# Patient Record
Sex: FEMALE | Race: OTHER | URBAN - METROPOLITAN AREA
[De-identification: names, ages, dates, MRNs, and addresses within clinical notes are randomized per-mention and may not be internally consistent; named-entity substitution may affect disease eponyms.]

---

## 2017-03-03 ENCOUNTER — OFFICE VISIT (OUTPATIENT)
Dept: FAMILY MEDICINE CLINIC | Facility: CLINIC | Age: 22
End: 2017-03-03

## 2017-03-03 VITALS
BODY MASS INDEX: 23.55 KG/M2 | SYSTOLIC BLOOD PRESSURE: 118 MMHG | OXYGEN SATURATION: 99 % | DIASTOLIC BLOOD PRESSURE: 60 MMHG | HEART RATE: 90 BPM | RESPIRATION RATE: 16 BRPM | WEIGHT: 128 LBS | TEMPERATURE: 99 F | HEIGHT: 62 IN

## 2017-03-03 DIAGNOSIS — J01.00 ACUTE NON-RECURRENT MAXILLARY SINUSITIS: Primary | ICD-10-CM

## 2017-03-03 PROCEDURE — 99202 OFFICE O/P NEW SF 15 MIN: CPT | Performed by: PHYSICIAN ASSISTANT

## 2017-03-03 RX ORDER — AMOXICILLIN AND CLAVULANATE POTASSIUM 875; 125 MG/1; MG/1
1 TABLET, FILM COATED ORAL 2 TIMES DAILY
Qty: 20 TABLET | Refills: 0 | Status: SHIPPED | OUTPATIENT
Start: 2017-03-03 | End: 2017-03-13

## 2017-03-03 NOTE — PROGRESS NOTES
CHIEF COMPLAINT:   Patient presents with:  Sinus Problem      HPI:   Rubén Celis is a 22year old female who presents for URI sxs for  2 weeks.   Patient reports nasal congestion with lime green discharge, maxillary sinus pain/pressure, sore throat rate LUNGS: clear to auscultation bilaterally, no wheezes or rhonchi. Breathing is non labored. CARDIO: RRR without murmur   EXTREMITIES: no cyanosis, clubbing or edema  LYMPH:  No anterior cervical lymphadenopathy. No submandibular lymphadenopathy.   No poster · Blockage in the nose  Symptoms of ABRS  The symptoms of ABRS may be different for each person, and can include:  · Nasal congestion  · Runny nose  · Fluid draining from the nose down the throat (postnasal drip)  · Headache  · Cough  · Pain in the sinuses · Swelling around your eyes  · Confusion or trouble staying awake   Date Last Reviewed: 3/3/2015  © 1670-2840 [address], [address] [place] [address]. All rights reserved.  This information is not intended as a substitute for pr

## 2017-03-03 NOTE — PATIENT INSTRUCTIONS
-Cool mist humidifier at night  -Warm tea with honey  -Sudafed  -Flonase  -Motrin for pain or fever    Acute Bacterial Rhinosinusitis (ABRS)  Acute bacterial rhinosinusitis (ABRS) is an infection of your nasal cavity and sinuses. It’s caused by bacteria.  A · Nasal corticosteroid medicine. Drops or spray used in the nose can lessen swelling and congestion. · Over-the-counter pain medicine. This is to lessen sinus pain and pressure. · Nasal decongestant medicine. Spray or drops may help to lessen congestion.

## 2020-12-21 ENCOUNTER — OFFICE VISIT (OUTPATIENT)
Dept: FAMILY MEDICINE CLINIC | Facility: CLINIC | Age: 25
End: 2020-12-21
Payer: COMMERCIAL

## 2020-12-21 VITALS
RESPIRATION RATE: 18 BRPM | SYSTOLIC BLOOD PRESSURE: 112 MMHG | TEMPERATURE: 98 F | BODY MASS INDEX: 27.6 KG/M2 | HEIGHT: 62 IN | WEIGHT: 150 LBS | HEART RATE: 67 BPM | DIASTOLIC BLOOD PRESSURE: 70 MMHG | OXYGEN SATURATION: 98 %

## 2020-12-21 DIAGNOSIS — J02.9 SORE THROAT: Primary | ICD-10-CM

## 2020-12-21 DIAGNOSIS — J01.00 ACUTE NON-RECURRENT MAXILLARY SINUSITIS: ICD-10-CM

## 2020-12-21 PROCEDURE — 3078F DIAST BP <80 MM HG: CPT | Performed by: PHYSICIAN ASSISTANT

## 2020-12-21 PROCEDURE — 99202 OFFICE O/P NEW SF 15 MIN: CPT | Performed by: PHYSICIAN ASSISTANT

## 2020-12-21 PROCEDURE — 87880 STREP A ASSAY W/OPTIC: CPT | Performed by: PHYSICIAN ASSISTANT

## 2020-12-21 PROCEDURE — 3074F SYST BP LT 130 MM HG: CPT | Performed by: PHYSICIAN ASSISTANT

## 2020-12-21 PROCEDURE — 3008F BODY MASS INDEX DOCD: CPT | Performed by: PHYSICIAN ASSISTANT

## 2020-12-21 RX ORDER — AMOXICILLIN 875 MG/1
875 TABLET, COATED ORAL 2 TIMES DAILY
Qty: 20 TABLET | Refills: 0 | Status: SHIPPED | OUTPATIENT
Start: 2020-12-21 | End: 2020-12-21

## 2020-12-21 RX ORDER — AMOXICILLIN AND CLAVULANATE POTASSIUM 875; 125 MG/1; MG/1
1 TABLET, FILM COATED ORAL 2 TIMES DAILY
Qty: 20 TABLET | Refills: 0 | Status: SHIPPED | OUTPATIENT
Start: 2020-12-21 | End: 2020-12-31

## 2020-12-21 NOTE — PATIENT INSTRUCTIONS
-push fluids  -Cool mist humidifier  -Sudafed during the day. Acute Bacterial Rhinosinusitis (ABRS)    Acute bacterial rhinosinusitis (ABRS) is an infection of your nasal cavity and sinuses. It’s caused by bacteria.  Acute means that you’ve had sympto corticosteroid medicine. Drops or spray used in the nose can lessen swelling and congestion. · Over-the-counter pain medicine. This is to lessen sinus pain and pressure. · Nasal decongestant medicine. Spray or drops may help to lessen congestion.  Do not

## 2020-12-21 NOTE — PROGRESS NOTES
CHIEF COMPLAINT:   Patient presents with:  Sinus Problem: sinus pain and pressure, congestion x 1 month ear pressure, sore throat x       HPI:   Dennis Vallejo is a 26year old female who presents for sinus pressure for  1 month.   Patient reports nasal co NOSE: Nostrils patent, no nasal discharge, nasal mucosa pink   THROAT: oral mucosa pink, moist. Posterior pharynx +mildly erythematous and injected. N0 exudates.  .  No uvular deviation, drooling, muffled voice, hot potato voice, trismus, or signs of absces The nasal cavity is the large air-filled space behind your nose. The sinuses are a group of spaces formed by the bones of your face. They connect with your nasal cavity. ABRS causes the tissue lining these spaces to become inflamed.  Mucus may not drain nor · Salt wash (saline irrigation). This can help to loosen mucus. Possible complications of ABRS  ABRS may come back or become long-term (chronic).  In rare cases, ABRS may cause complications such as:   · Inflamed tissue around the brain and spinal cord (me

## 2021-09-20 ENCOUNTER — OFFICE VISIT (OUTPATIENT)
Dept: FAMILY MEDICINE CLINIC | Facility: CLINIC | Age: 26
End: 2021-09-20
Payer: COMMERCIAL

## 2021-09-20 VITALS
SYSTOLIC BLOOD PRESSURE: 127 MMHG | OXYGEN SATURATION: 99 % | TEMPERATURE: 98 F | DIASTOLIC BLOOD PRESSURE: 79 MMHG | HEIGHT: 63 IN | RESPIRATION RATE: 16 BRPM | BODY MASS INDEX: 28.35 KG/M2 | HEART RATE: 86 BPM | WEIGHT: 160 LBS

## 2021-09-20 DIAGNOSIS — J02.9 SORE THROAT: Primary | ICD-10-CM

## 2021-09-20 DIAGNOSIS — J01.00 ACUTE NON-RECURRENT MAXILLARY SINUSITIS: ICD-10-CM

## 2021-09-20 LAB
CONTROL LINE PRESENT WITH A CLEAR BACKGROUND (YES/NO): YES YES/NO
KIT LOT #: NORMAL NUMERIC

## 2021-09-20 PROCEDURE — 3074F SYST BP LT 130 MM HG: CPT | Performed by: PHYSICIAN ASSISTANT

## 2021-09-20 PROCEDURE — 99213 OFFICE O/P EST LOW 20 MIN: CPT | Performed by: PHYSICIAN ASSISTANT

## 2021-09-20 PROCEDURE — 3078F DIAST BP <80 MM HG: CPT | Performed by: PHYSICIAN ASSISTANT

## 2021-09-20 PROCEDURE — 3008F BODY MASS INDEX DOCD: CPT | Performed by: PHYSICIAN ASSISTANT

## 2021-09-20 PROCEDURE — 87880 STREP A ASSAY W/OPTIC: CPT | Performed by: PHYSICIAN ASSISTANT

## 2021-09-20 RX ORDER — LEVONORGESTREL 52 MG/1
1 INTRAUTERINE DEVICE INTRAUTERINE ONCE
COMMUNITY

## 2021-09-20 RX ORDER — AMOXICILLIN AND CLAVULANATE POTASSIUM 875; 125 MG/1; MG/1
1 TABLET, FILM COATED ORAL 2 TIMES DAILY
Qty: 20 TABLET | Refills: 0 | Status: SHIPPED | OUTPATIENT
Start: 2021-09-20 | End: 2021-09-30

## 2021-09-20 RX ORDER — FLUTICASONE PROPIONATE 50 MCG
1 SPRAY, SUSPENSION (ML) NASAL DAILY
COMMUNITY
Start: 2021-06-23

## 2021-09-20 NOTE — PROGRESS NOTES
CHIEF COMPLAINT:   Patient presents with:  Sinus Problem    HPI:   Rosalie Santos is a 26year old female who presents with URI symptoms for 7 days. Started off as more allergies, but sxs are worsening. Patient denies known COVID exposure.  Patient is vac 16   Ht 5' 3\" (1.6 m)   Wt 160 lb (72.6 kg)   LMP 08/18/2021 (LMP Unknown)   SpO2 99%   Breastfeeding No   BMI 28.34 kg/m²   GENERAL: well developed, well nourished,in no apparent distress  SKIN: no rashes,no suspicious lesions  HEAD: atraumatic, normocep explained and discussed.         Patient Instructions     -Push fluids  -Cool mist humidifier  -Tylenol  -mucinex or sudafed  -Must be seen with worsening symptoms  -May start antibiotic if covid test is negative and symptoms are not improving    Alejandra Follow the recommendations of your local public health department if you need to quarantine.  Options they will consider include stopping quarantine  • After 14 days from date of last exposure  • After 10 days without testing from date of last exposure  • A people in your household, like dishes, towels, and bedding   10. Clean all surfaces that are touched often, like counters, tabletops, and doorknobs. Use household cleaning sprays or wipes according to the label instructions.          Seek Further Care     I instructions. Your test results will be called to you from an [facility] representative. If you have not received a call within 2 business days, please call your primary care provider or check [facility] for results.     Post-Discharge Follow-up  If you following websites:   Centers for Disease Control & Prevention (CDC)  What to do if you are sick with coronavirus disease 2019, [email or web address].[email or web address]. pdf  Centers for Disease Control & Prevention • Wear a mask in public  • Avoid large gatherings  • Wash your hands frequently  • Stay at least 6 feet away from other people    References:  Long haulers: Why some people experience long-term coronavirus symptoms. (2021, February 08).  Retrieved March 1 Esther Garcia had an upper respiratory infection like a cold and cough for 10 or more days without improvement or with worsening symptoms. Your healthcare provider will ask about your symptoms and your medical history.  The provider will check your vital signs, in instructions. The patient/parent indicates understanding of these issues and agrees to the plan.

## 2021-09-20 NOTE — PATIENT INSTRUCTIONS
-Push fluids  -Cool mist humidifier  -Tylenol  -mucinex or sudafed  -Must be seen with worsening symptoms  -May start antibiotic if covid test is negative and symptoms are not improving    Coronavirus Disease 2019 (COVID-19)     Mariam Oro is c department if you need to quarantine.  Options they will consider include stopping quarantine  • After 14 days from date of last exposure  • After 10 days without testing from date of last exposure  • After day 7 from date of last exposure with a negative t 10. Clean all surfaces that are touched often, like counters, tabletops, and doorknobs. Use household cleaning sprays or wipes according to the label instructions.          Seek Further Care     If you are awaiting test results or are confirmed positive f an [facility] representative. If you have not received a call within 2 business days, please call your primary care provider or check [facility] for results.     Post-Discharge Follow-up  If you are diagnosed with COVID, refrain from exercise until approv Prevention (CDC)  What to do if you are sick with coronavirus disease 2019, [email or web address]. pdf  Centers for Disease Control & Prevention (CDC)  10 things you can do to manage your health a gatherings  • Wash your hands frequently  • Stay at least 6 feet away from other people    References:  Long haulers: Why some people experience long-term coronavirus symptoms. (2021, February 08).  Retrieved March 17, 2021, from [email or web address] like a cold and cough for 10 or more days without improvement or with worsening symptoms. Your healthcare provider will ask about your symptoms and your medical history. The provider will check your vital signs, including your temperature.  You’ll have a ph

## 2021-09-22 LAB — SARS-COV-2 RNA RESP QL NAA+PROBE: NOT DETECTED

## 2022-05-31 ENCOUNTER — OFFICE VISIT (OUTPATIENT)
Dept: FAMILY MEDICINE CLINIC | Facility: CLINIC | Age: 27
End: 2022-05-31
Payer: COMMERCIAL

## 2022-05-31 VITALS
TEMPERATURE: 99 F | DIASTOLIC BLOOD PRESSURE: 64 MMHG | OXYGEN SATURATION: 97 % | BODY MASS INDEX: 29.44 KG/M2 | SYSTOLIC BLOOD PRESSURE: 124 MMHG | RESPIRATION RATE: 18 BRPM | HEIGHT: 62 IN | WEIGHT: 160 LBS | HEART RATE: 86 BPM

## 2022-05-31 DIAGNOSIS — J02.9 PHARYNGITIS, UNSPECIFIED ETIOLOGY: ICD-10-CM

## 2022-05-31 DIAGNOSIS — B34.9 VIRAL SYNDROME: Primary | ICD-10-CM

## 2022-05-31 DIAGNOSIS — Z20.822 SUSPECTED COVID-19 VIRUS INFECTION: ICD-10-CM

## 2022-05-31 LAB
CONTROL LINE PRESENT WITH A CLEAR BACKGROUND (YES/NO): YES YES/NO
KIT LOT #: NORMAL NUMERIC
STREP GRP A CUL-SCR: NEGATIVE

## 2022-05-31 PROCEDURE — 3078F DIAST BP <80 MM HG: CPT | Performed by: NURSE PRACTITIONER

## 2022-05-31 PROCEDURE — 99213 OFFICE O/P EST LOW 20 MIN: CPT | Performed by: NURSE PRACTITIONER

## 2022-05-31 PROCEDURE — 87637 SARSCOV2&INF A&B&RSV AMP PRB: CPT | Performed by: NURSE PRACTITIONER

## 2022-05-31 PROCEDURE — 3074F SYST BP LT 130 MM HG: CPT | Performed by: NURSE PRACTITIONER

## 2022-05-31 PROCEDURE — 87880 STREP A ASSAY W/OPTIC: CPT | Performed by: NURSE PRACTITIONER

## 2022-05-31 PROCEDURE — 3008F BODY MASS INDEX DOCD: CPT | Performed by: NURSE PRACTITIONER

## 2022-06-01 LAB
FLUAV + FLUBV RNA SPEC NAA+PROBE: NOT DETECTED
FLUAV + FLUBV RNA SPEC NAA+PROBE: NOT DETECTED
RSV RNA SPEC NAA+PROBE: NOT DETECTED
SARS-COV-2 RNA RESP QL NAA+PROBE: DETECTED

## 2025-07-27 ENCOUNTER — OFFICE VISIT (OUTPATIENT)
Dept: FAMILY MEDICINE CLINIC | Facility: CLINIC | Age: 30
End: 2025-07-27
Payer: COMMERCIAL

## 2025-07-27 VITALS
HEIGHT: 62 IN | DIASTOLIC BLOOD PRESSURE: 77 MMHG | RESPIRATION RATE: 18 BRPM | OXYGEN SATURATION: 98 % | WEIGHT: 160 LBS | TEMPERATURE: 98 F | HEART RATE: 84 BPM | BODY MASS INDEX: 29.44 KG/M2 | SYSTOLIC BLOOD PRESSURE: 120 MMHG

## 2025-07-27 DIAGNOSIS — B35.4 TINEA CORPORIS: Primary | ICD-10-CM

## 2025-07-27 PROCEDURE — 3078F DIAST BP <80 MM HG: CPT | Performed by: NURSE PRACTITIONER

## 2025-07-27 PROCEDURE — 3008F BODY MASS INDEX DOCD: CPT | Performed by: NURSE PRACTITIONER

## 2025-07-27 PROCEDURE — 3074F SYST BP LT 130 MM HG: CPT | Performed by: NURSE PRACTITIONER

## 2025-07-27 PROCEDURE — 99203 OFFICE O/P NEW LOW 30 MIN: CPT | Performed by: NURSE PRACTITIONER

## 2025-07-27 RX ORDER — KETOCONAZOLE 20 MG/G
CREAM TOPICAL
Qty: 30 G | Refills: 1 | Status: SHIPPED | OUTPATIENT
Start: 2025-07-27

## 2025-07-27 NOTE — PROGRESS NOTES
CHIEF COMPLAINT:     Chief Complaint   Patient presents with    Rash     Ring worm, been using over the counter cream two-three times a day for about almost two weeks now. Seems to be spreading. Located in now both inner thighs. - Entered by patient          HPI:    Jaimie Lee is a 29 year old female who presents for evaluation of a rash.  Per patient rash started in the past several weeks. Rash has been worsening since onset.  Patient has had similar rash in the past. The rash is characterized by red spots. The affected locations include inner thighs. Patient has treated rash with otc medication.  Associated symptoms include: itching occasionally  Exposure: working out in gym.    Pertinent negatives include no anorexia, congestion, cough, diarrhea, eye pain, facial edema, fatigue, fever, joint pain, rhinorrhea, shortness of breath, sore throat or vomiting.      Current Medications[1]   Past Medical History[2]   Past Surgical History[3]   Family History[4]   Short Social Hx on File[5]      REVIEW OF SYSTEMS:   GENERAL: feels well otherwise, no fever, no chills.  SKIN: Per HPI. No edema. No ulcerations.  HEENT: Denies rhinorrhea, edema of the lips or swelling of throat.  CARDIOVASCULAR: Denies chest pains or palpitations.  LUNGS: Denies shortness of breath with exertion or rest. No cough or wheezing.  LYMPH: Denies enlargement of the lymph nodes.  NEURO: Denies abnormal sensation, tingling of the skin, or numbness.      EXAM:   /77   Pulse 84   Temp 97.8 °F (36.6 °C)   Resp 18   Ht 5' 2\" (1.575 m)   Wt 160 lb (72.6 kg)   LMP 07/08/2025 (Exact Date)   SpO2 98%   BMI 29.26 kg/m²   GENERAL: well developed, well nourished,in no apparent distress  SKIN: b/l inner thighs that have irregular round  ring shaped rash with clear center and red ring surrounding that is inflamed  EYES: PERRLA, EOMI, conjunctiva are clear  HENT: Head atraumatic, normocephalic. TM's WNL bilaterally. Normal external nose. Nasal  mucosa pink without edema. No erythema of the throat. Oropharynx moist without lesions.  LUNGS: Clear to auscultation bilaterally.  No wheezing, rhonchi, or rales.  No diminished breath sounds. No increased work of breathing.   CARDIO: RRR without murmur  JOINTS: normal ROM of   LYMPH: No  lymphadenopathy.     ASSESSMENT AND PLAN:   Jaimie Lee is a 29 year old female who presents for evaluation of a rash. Findings are consistent with:    ASSESSMENT:  Encounter Diagnosis   Name Primary?    Tinea corporis Yes       PLAN: Meds as listed below.  Comfort measures as described in Patient Instructions.  Skin care discussed with patient.     Educated on supportive measures: ibuprofen/tylenol, hydration  Educated on s/s of worsening sx and when to seek higher level of care  Follow up with pcp if not improving      Meds & Refills for this Visit:  Requested Prescriptions     Signed Prescriptions Disp Refills    ketoconazole 2 % External Cream 30 g 1     Sig: Apply a thin layer once daily for 2 weeks.         Risk and benefits of medication discussed.     The patient indicates understanding of these issues and agrees to the plan.  The patient is asked to return in 3 days if sx's persist or worsen.            [1]   Current Outpatient Medications   Medication Sig Dispense Refill    ketoconazole 2 % External Cream Apply a thin layer once daily for 2 weeks. 30 g 1    Fluticasone Propionate 50 MCG/ACT Nasal Suspension 1 spray by Nasal route daily. (Patient not taking: Reported on 5/31/2022)      levonorgestrel (MIRENA, 52 MG,) 20 MCG/24HR Intrauterine IUD 1 each by Intrauterine route one time.     [2] History reviewed. No pertinent past medical history.  [3] History reviewed. No pertinent surgical history.  [4]   Family History  Problem Relation Age of Onset    Lipids Father     Hypertension Mother    [5]   Social History  Socioeconomic History    Marital status: Single   Tobacco Use    Smoking status: Never    Smokeless  tobacco: Never   Substance and Sexual Activity    Alcohol use: No    Drug use: No

## 2025-08-13 ENCOUNTER — OFFICE VISIT (OUTPATIENT)
Dept: FAMILY MEDICINE CLINIC | Facility: CLINIC | Age: 30
End: 2025-08-13

## 2025-08-13 VITALS
WEIGHT: 165 LBS | RESPIRATION RATE: 18 BRPM | HEIGHT: 62 IN | OXYGEN SATURATION: 97 % | SYSTOLIC BLOOD PRESSURE: 126 MMHG | TEMPERATURE: 98 F | DIASTOLIC BLOOD PRESSURE: 74 MMHG | HEART RATE: 84 BPM | BODY MASS INDEX: 30.36 KG/M2

## 2025-08-13 DIAGNOSIS — J01.10 ACUTE NON-RECURRENT FRONTAL SINUSITIS: Primary | ICD-10-CM

## 2025-08-13 PROCEDURE — 3008F BODY MASS INDEX DOCD: CPT | Performed by: NURSE PRACTITIONER

## 2025-08-13 PROCEDURE — 3074F SYST BP LT 130 MM HG: CPT | Performed by: NURSE PRACTITIONER

## 2025-08-13 PROCEDURE — 99213 OFFICE O/P EST LOW 20 MIN: CPT | Performed by: NURSE PRACTITIONER

## 2025-08-13 PROCEDURE — 3078F DIAST BP <80 MM HG: CPT | Performed by: NURSE PRACTITIONER

## (undated) NOTE — MR AVS SNAPSHOT
[address] [facility]  [place] [address] [phone]               Thank you for choosing us for your health care visit with TAMMI Bergeron.   We are glad to serve you and happy to provide you with [facility] cold and cough for longer than 10 to 14 days. Your health care provider will ask about your symptoms and your medical history. The provider will check your vital signs, including your temperature.  You’ll have a physical exam. The health care provider will Current Medications          This list is accurate as of: 3/3/17  3:06 PM.  Always use your most recent med list.                Amoxicillin-Pot Clavulanate 875-125 MG Tabs   Take 1 tablet by mouth 2 (two) times daily.    Commonly known as:  AUGMENTIN drinks, candies and desserts   Eat plenty of low-fat dairy products High fat meats and dairy   Choose whole grain products Foods high in sodium   Water is best for hydration Fast food.    Eat at home when possible     Tips for increasing your physical activ